# Patient Record
Sex: MALE | Race: WHITE
[De-identification: names, ages, dates, MRNs, and addresses within clinical notes are randomized per-mention and may not be internally consistent; named-entity substitution may affect disease eponyms.]

---

## 2018-01-03 ENCOUNTER — HOSPITAL ENCOUNTER (EMERGENCY)
Dept: HOSPITAL 11 - JP.ED | Age: 43
Discharge: HOME | End: 2018-01-03
Payer: COMMERCIAL

## 2018-01-03 VITALS — SYSTOLIC BLOOD PRESSURE: 186 MMHG | DIASTOLIC BLOOD PRESSURE: 110 MMHG
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
## 2018-01-03 DIAGNOSIS — I10: ICD-10-CM

## 2018-01-03 DIAGNOSIS — Z88.0: ICD-10-CM

## 2018-01-03 DIAGNOSIS — L50.0: Primary | ICD-10-CM

## 2018-01-03 DIAGNOSIS — E03.9: ICD-10-CM

## 2018-01-03 PROCEDURE — 96375 TX/PRO/DX INJ NEW DRUG ADDON: CPT

## 2018-01-03 PROCEDURE — 99283 EMERGENCY DEPT VISIT LOW MDM: CPT

## 2018-01-03 PROCEDURE — 96361 HYDRATE IV INFUSION ADD-ON: CPT

## 2018-01-03 PROCEDURE — 96374 THER/PROPH/DIAG INJ IV PUSH: CPT

## 2018-01-03 NOTE — EDM.PDOC
ED HPI GENERAL MEDICAL PROBLEM





- General


Chief Complaint: Allergic Reaction


Stated Complaint: ALLERGIC REACTION


Time Seen by Provider: 01/03/18 19:29


Source of Information: Reports: Patient


History Limitations: Reports: No Limitations





- History of Present Illness


INITIAL COMMENTS - FREE TEXT/NARRATIVE: 





Hives, Allergic reaction; this is a 42 yr old male presents to ER for hives. He 

reports was ice fishing, wearing a new hat with rabbit fur, his face and scalp 

felt hot and began to itch, then feeling radiated to upper back and chest. 

started to have a coughing spell, took a benadryl, showered, but rash was worsen

, decided to come to ER for evaluation. 





skin; rash


chest; cough prior to coming to ER, 


Onset: Sudden


Onset Date: 01/03/18


Onset Time: 15:00


Duration: Hour(s):, Constant


Location: Reports: Generalized


Quality: Reports: Other (rash, hives)


Severity: Mild


Improves with: Reports: None


Worsens with: Reports: None


Associated Symptoms: Reports: Cough, Rash


Treatments PTA: Reports: Other Medication(s) (benadryl at 6 pm.), Other (see 

below)


Other Treatments PTA: Benadryl





- Related Data


 Allergies











Allergy/AdvReac Type Severity Reaction Status Date / Time


 


Penicillins Allergy  Hives Verified 11/10/15 14:24











Home Meds: 


 Home Meds





Levothyroxine 25 mcg PO DAILY 07/26/14 [History]


Lisinopril 20 mg PO DAILY 07/26/14 [History]


LORazepam [LORazepam] 0.5 mg PO ASDIRECTED PRN 01/03/18 [History]


Metoprolol Tartrate [Metoprolol Tartrate] 25 mg PO DAILY 01/03/18 [History]


PARoxetine [Paxil] 20 mg PO DAILY 01/03/18 [History]











Past Medical History


Cardiovascular History: Reports: Hypertension


Psychiatric History: Reports: Anxiety


Endocrine/Metabolic History: Reports: Hypothyroidism





- Past Surgical History


Other HEENT Surgeries/Procedures: Lasik to both eyes





Social & Family History





- Tobacco Use


Smoking Status *Q: Former Smoker


Years of Tobacco use: 29


Packs/Tins Daily: 1


Used Tobacco, but Quit: Yes


Month Tobacco Last Used: 1/6/12





- Caffeine Use


Caffeine Use: Reports: Soda





- Alcohol Use


Days Per Week of Alcohol Use: 2


Number of Drinks Per Day: 2


Total Drinks Per Week: 4


Date of Last Drink: 12/31/17


Time of Last Drink: 22:00





- Recreational Drug Use


Recreational Drug Use: No





- Living Situation & Occupation


Living situation: Reports: 


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





ED ROS ALLERGIC REACTION





- Review of Systems


Review Of Systems: See Below


Constitutional: Reports: Other (anxious)


HEENT: Reports: No Symptoms


Respiratory: Reports: Cough


Cardiovascular: Reports: No Symptoms


Endocrine: Reports: No Symptoms


GI/Abdominal: Reports: No Symptoms


: Reports: No Symptoms


Musculoskeletal: Reports: No Symptoms


Skin: Reports: Rash, Urticaria (face, upper chest and back, arms.)


Neurological: Reports: No Symptoms


Psychiatric: Reports: Anxiety


Hematologic/Lymphatic: Reports: No Symptoms


Immunologic: Reports: Other (concerns for new allergy to rabbit fur)





ED EXAM GENERAL NO PERIP PULSE





- Physical Exam


Exam: See Below


Exam Limited By: No Limitations


General Appearance: Alert, WD/WN, Mild Distress


Eye Exam: Bilateral Eye: EOMI, Normal Inspection


Ears: Normal External Exam, Normal Canal, Hearing Grossly Normal, Normal TMs


Nose: Normal Inspection, Normal Mucosa, No Blood


Throat/Mouth: Normal Inspection, Normal Lips, Normal Teeth, Normal Gums, Normal 

Oropharynx, Normal Voice, No Airway Compromise


Head: Atraumatic, Normocephalic


Neck: Normal Inspection, Supple, Non-Tender, Full Range of Motion


Respiratory/Chest: No Respiratory Distress, Lungs Clear, Normal Breath Sounds, 

No Accessory Muscle Use, Chest Non-Tender


Cardiovascular: Normal Peripheral Pulses, Regular Rate, Rhythm, No Edema, No 

Murmur, No Rub


GI/Abdominal: Normal Bowel Sounds, Soft, Non-Tender, No Organomegaly, No 

Distention, No Abnormal Bruit, No Mass


 (Male) Exam: Deferred


Rectal (Males) Exam: Deferred


Back Exam: Normal Inspection, Full Range of Motion, NT


Extremities: Normal Inspection, Normal Range of Motion, Non-Tender, Normal 

Capillary Refill, No Pedal Edema


Neurological: Alert, Oriented, CN II-XII Intact, Normal Cognition, Normal Gait, 

Normal Reflexes, No Motor/Sensory Deficits


Psychiatric: Anxious


Skin Exam: Warm, Dry, Other (hives noted to face, neck, upper chest and back, 

arm. mild raised macupapular rash. few scatterer wheals. + pruritus)


Lymphatic: No Adenopathy





Course





- Vital Signs


Last Recorded V/S: 


 Last Vital Signs











Temp  36.3 C   01/03/18 20:30


 


Pulse  93   01/03/18 20:30


 


Resp  17   01/03/18 20:30


 


BP  180/120 H  01/03/18 20:30


 


Pulse Ox  99   01/03/18 20:30














- Orders/Labs/Meds


Orders: 


 Active Orders 24 hr











 Category Date Time Status


 


 Sodium Chloride 0.9% [Normal Saline] 1,000 ml Med  01/03/18 19:45 Active





 IV ASDIRECTED   








 Medication Orders





Sodium Chloride (Normal Saline)  1,000 mls @ 999 mls/hr IV ASDIRECTED NICOLA


   Last Admin: 01/03/18 20:09  Dose: 999 mls/hr








Meds: 


Medications











Generic Name Dose Route Start Last Admin





  Trade Name Freq  PRN Reason Stop Dose Admin


 


Sodium Chloride  1,000 mls @ 999 mls/hr  01/03/18 19:45  01/03/18 20:09





  Normal Saline  IV   999 mls/hr





  ASDIRECTED NICOLA   Administration














Discontinued Medications














Generic Name Dose Route Start Last Admin





  Trade Name Freq  PRN Reason Stop Dose Admin


 


Lisinopril  20 mg  01/03/18 19:42  01/03/18 20:20





  Prinivil  PO  01/03/18 19:43  20 mg





  ONETIME ONE   Administration


 


Lisinopril  Confirm  01/03/18 20:18  





  Prinivil  Administered  01/03/18 20:19  





  Dose   





  10 mg   





  .ROUTE   





  .STK-MED ONE   


 


Lorazepam  1 mg  01/03/18 19:41  01/03/18 20:15





  Ativan  IVPUSH  01/03/18 19:42  1 mg





  ONETIME ONE   Administration


 


Methylprednisolone Sodium Succinate  125 mg  01/03/18 19:43  01/03/18 20:11





  Solu-Medrol  IVPUSH  01/03/18 19:44  125 mg





  ONETIME ONE   Administration


 


Metoprolol Tartrate  25 mg  01/03/18 19:42  01/03/18 20:13





  Lopressor  PO  01/03/18 19:43  25 mg





  ONETIME ONE   Administration














- Re-Assessments/Exams


Free Text/Narrative Re-Assessment/Exam: 





01/03/18 21:06


given IV Normal Saline  1liter, IV ativan 1mg, IV solumedrol 125mg ; improved, 

ready for discharge to home.





given home medication Metoprolol 25 mg po  and Lisinopril 20 mg po








Departure





- Departure


Time of Disposition: 21:09


Disposition: Home, Self-Care 01


Condition: Good


Clinical Impression: 


Hypertension


Qualifiers:


 Hypertension type: unspecified Qualified Code(s): I10 - Essential (primary) 

hypertension





Allergic reaction


Qualifiers:


 Encounter type: initial encounter Qualified Code(s): T78.40XA - Allergy, 

unspecified, initial encounter








- Discharge Information


Referrals: 


PCP,None [Primary Care Provider] - 


Forms:  ED Department Discharge


Care Plan Goals: 


Allergic Reaction


-given Solumedrol 125mg iv in ER, IV fluids; improved





-home medication


  start in morning Medrol dose pack as directed


  continue Benadryl 25mg by mouth every 4 to 6 hours





avoid any exercise, running, physical activities for the next 5 days.


return to ER for shortness of breath, cough, worsen hives/rash, swelling or any 

concerns.





High blood pressure


-take medications daily as prescribed by your Primary Care Provider


-given Metoprolol tartate 25mg po and Lisinopril 20 mg po in ER


-restart medication in am.





Return to Clinic or ER if not improved or symptoms worsen.





- Problem List & Annotations


(1) Hypertension


SNOMED Code(s): 22591269


   Code(s): I10 - ESSENTIAL (PRIMARY) HYPERTENSION   Status: Acute   Priority: 

High   Current Visit: Yes   


Qualifiers: 


   Hypertension type: unspecified   Qualified Code(s): I10 - Essential (primary

) hypertension   





(2) Allergic reaction


SNOMED Code(s): 237538325


   Code(s): T78.40XA - ALLERGY, UNSPECIFIED, INITIAL ENCOUNTER   Status: Acute 

  Priority: High   Current Visit: Yes   


Qualifiers: 


   Encounter type: initial encounter   Qualified Code(s): T78.40XA - Allergy, 

unspecified, initial encounter   





- Problem List Review


Problem List Initiated/Reviewed/Updated: Yes





- My Orders


Last 24 Hours: 


My Active Orders





01/03/18 19:45


Sodium Chloride 0.9% [Normal Saline] 1,000 ml IV ASDIRECTED 














- Assessment/Plan


Last 24 Hours: 


My Active Orders





01/03/18 19:45


Sodium Chloride 0.9% [Normal Saline] 1,000 ml IV ASDIRECTED 











Plan: 


Allergic Reaction


-given IV Solumedrol 125mg, IV Ativan 1 mg., Normal Saline IV fluids; improved





-home medication


  start in morning Medrol dose pack as directed


  continue Benadryl 25mg by mouth every 4 to 6 hours





avoid any exercise, running, physical activities for the next 5 days.


return to ER for shortness of breath, cough, worsen hives/rash, swelling or any 

concerns.





High blood pressure


-take medications daily as prescribed by your Primary Care Provider


-given Metoprolol tartate 25mg po and Lisinopril 20 mg po in ER


-restart medication in am.





Return to Clinic or ER if not improved or symptoms worsen.

## 2019-03-23 ENCOUNTER — HOSPITAL ENCOUNTER (EMERGENCY)
Dept: HOSPITAL 11 - JP.ED | Age: 44
Discharge: HOME | End: 2019-03-23
Payer: COMMERCIAL

## 2019-03-23 VITALS — DIASTOLIC BLOOD PRESSURE: 109 MMHG | SYSTOLIC BLOOD PRESSURE: 157 MMHG

## 2019-03-23 DIAGNOSIS — E03.9: ICD-10-CM

## 2019-03-23 DIAGNOSIS — F41.9: ICD-10-CM

## 2019-03-23 DIAGNOSIS — Z88.0: ICD-10-CM

## 2019-03-23 DIAGNOSIS — Z79.899: ICD-10-CM

## 2019-03-23 DIAGNOSIS — I10: ICD-10-CM

## 2019-03-23 DIAGNOSIS — Z87.891: ICD-10-CM

## 2019-03-23 DIAGNOSIS — T63.441A: Primary | ICD-10-CM

## 2019-03-23 PROCEDURE — 96374 THER/PROPH/DIAG INJ IV PUSH: CPT

## 2019-03-23 PROCEDURE — 99282 EMERGENCY DEPT VISIT SF MDM: CPT

## 2019-03-23 NOTE — EDM.PDOC
ED HPI GENERAL MEDICAL PROBLEM





- General


Chief Complaint: Eye Problems


Stated Complaint: STUNG BY BEE


Time Seen by Provider: 03/23/19 16:12


Source of Information: Reports: Patient


History Limitations: Reports: No Limitations





- History of Present Illness


INITIAL COMMENTS - FREE TEXT/NARRATIVE: 


45 yo otherwise healthy presents with concerns of bee sting


Was working with bees this afternoon


Stung near the left eye


Has noticed swelling here, some itching involving the left lip.


No perioral, tongue, or oropharyngeal tingling


No dyspnea


No rash


Hx of prior bee stings, caused swelling, never this severe








- Related Data


 Allergies











Allergy/AdvReac Type Severity Reaction Status Date / Time


 


Penicillins Allergy  Hives Verified 03/23/19 15:20











Home Meds: 


 Home Meds





RX: Levothyroxine 25 mcg PO DAILY 07/26/14 [History]


RX: Lisinopril 20 mg PO DAILY 07/26/14 [History]


LORazepam 0.5 mg PO ASDIRECTED PRN 01/03/18 [History]


Metoprolol Tartrate 50 mg PO DAILY 01/03/18 [History]


PARoxetine [Paxil] 20 mg PO DAILY 01/03/18 [History]











Past Medical History


Cardiovascular History: Reports: Hypertension


Neurological History: Reports: Concussion


Psychiatric History: Reports: Anxiety


Endocrine/Metabolic History: Reports: Hypothyroidism





- Past Surgical History


Other HEENT Surgeries/Procedures: Lasik to both eyes





Social & Family History





- Tobacco Use


Smoking Status *Q: Former Smoker


Used Tobacco, but Quit: Yes


Month/Year Tobacco Last Used: 0





- Caffeine Use


Caffeine Use: Reports: Soda





- Recreational Drug Use


Recreational Drug Use: No





- Living Situation & Occupation


Living situation: Reports: 


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





ED ROS GENERAL





- Review of Systems


Review Of Systems: See Below


Constitutional: Reports: No Symptoms


HEENT: Reports: No Symptoms


Respiratory: Reports: No Symptoms.  Denies: Shortness of Breath, Wheezing


Cardiovascular: Reports: No Symptoms


Endocrine: Reports: No Symptoms


GI/Abdominal: Denies: Abdominal Pain


: Reports: No Symptoms


Musculoskeletal: Reports: No Symptoms


Skin: Reports: Other (swelling around left eye).  Denies: Urticaria


Neurological: Reports: No Symptoms


Psychiatric: Reports: No Symptoms


Hematologic/Lymphatic: Reports: No Symptoms


Immunologic: Reports: No Symptoms





ED EXAM GENERAL W FULL EYE





- Physical Exam


Exam: See Below


Exam Limited By: No Limitations


General Appearance: Alert, No Apparent Distress


Comments: 


erythema and swelling around the left eye





Ears: Normal External Exam


Nose: Normal Inspection


Throat/Mouth: Normal Inspection, No Airway Compromise


Head: Atraumatic, Normocephalic


Neck: Normal Inspection


Respiratory/Chest: Lungs Clear.  No: Wheezing


Cardiovascular: Regular Rate, Rhythm


GI/Abdominal: Soft, Non-Tender


Back Exam: Normal Inspection


Extremities: Normal Inspection


Neurological: Alert, Oriented


Psychiatric: Normal Affect


Skin Exam: Warm, Dry.  No: Rash





Course





- Vital Signs


Last Recorded V/S: 


 Last Vital Signs











Temp  35.0 C L  03/23/19 15:19


 


Pulse  83   03/23/19 16:59


 


Resp  25 H  03/23/19 16:59


 


BP  157/109 H  03/23/19 16:59


 


Pulse Ox  95   03/23/19 16:59














- Orders/Labs/Meds


Meds: 


Medications














Discontinued Medications














Generic Name Dose Route Start Last Admin





  Trade Name Freq  PRN Reason Stop Dose Admin


 


Methylprednisolone Sodium Succinate  125 mg  03/23/19 15:54  03/23/19 16:01





  Solu-Medrol  IVPUSH  03/23/19 15:55  125 mg





  ONETIME ONE   Administration





     





     





     





     














- Re-Assessments/Exams


Free Text/Narrative Re-Assessment/Exam: 


45 yo with facial swelling following bee sting. 


Localized reaction at this point. No airway involvement or other signs of 

anaphylaxis


Took 50 mg benadryl PTA


Administering solu-medrol


Will observe  


03/23/19 16:15








Departure





- Departure


Time of Disposition: 17:03


Disposition: Home, Self-Care 01


Clinical Impression: 


Bee sting


Qualifiers:


 Encounter type: initial encounter Injury intent: accidental or unintentional 

Qualified Code(s): T63.441A - Toxic effect of venom of bees, accidental (

unintentional), initial encounter








- Discharge Information


Referrals: 


PCP,None [Primary Care Provider] - 


Forms:  ED Department Discharge


Additional Instructions: 


Please take the prescribed prednisone and benadryl


Return to the ER for shortness of breath, abdominal pain, or worsening symptoms

## 2020-08-17 NOTE — EDM.PDOC
ED HPI GENERAL MEDICAL PROBLEM





- General


Chief Complaint: Skin Complaint


Stated Complaint: CELLULITIS ON NOSE


Time Seen by Provider: 08/17/20 05:28


Source of Information: Reports: Patient


History Limitations: Reports: No Limitations





- History of Present Illness


INITIAL COMMENTS - FREE TEXT/NARRATIVE: 





Patient presents for continued evaluation redness and pain on his nose and face.

 Symptoms began on Friday, August 14 with a single red spot on the the right 

side of the nose.  It was burning and extremely painful.  He was seen by primary

care who prescribed doxycycline for presumed skin/soft tissue infection.  He 

continued to have increasing areas of redness and pain over the right cheek and 

did a video virtual visit Sunday and was prescribed cephalexin in addition to 

doxycycline for symptoms.  He noticed pain in the roof of his mouth and thought 

that he might have burned his mouth eating pizza but he is also noticed swelling

of the upper lip and some single red patches that are also burning and very 

painful on the right half of the upper lip region and now extending laterally 

toward the ear.  He has been unable to sleep for the last 2 nights because of 

the pain associated with this infection.


Onset: Gradual


Duration: Day(s): (4)


Location: Reports: Face


Quality: Reports: Burning, Throbbing


Severity: Moderate


Improves with: Reports: None


Worsens with: Reports: Movement


Associated Symptoms: Reports: No Other Symptoms


  ** Middle Nose


Pain Score (Numeric/FACES): 8





- Related Data


                                    Allergies











Allergy/AdvReac Type Severity Reaction Status Date / Time


 


Penicillins Allergy  Hives Verified 08/17/20 05:17











Home Meds: 


                                    Home Meds





Levothyroxine 25 mcg PO DAILY 07/26/14 [History]


Lisinopril 20 mg PO DAILY 07/26/14 [History]


LORazepam 0.5 mg PO ASDIRECTED PRN 01/03/18 [History]


Metoprolol Tartrate 50 mg PO DAILY 01/03/18 [History]


PARoxetine [Paxil] 20 mg PO DAILY 01/03/18 [History]


Multivitamin 1 tab PO DAILY 08/17/20 [History]











Past Medical History


HEENT History: Reports: Impaired Vision


Cardiovascular History: Reports: Hypertension


Respiratory History: Reports: Asthma


Neurological History: Reports: Concussion


Psychiatric History: Reports: Anxiety


Endocrine/Metabolic History: Reports: Hypothyroidism





- Past Surgical History


HEENT Surgical History: Reports: LASIK


Other HEENT Surgeries/Procedures: Lasik to both eyes





Social & Family History





- Tobacco Use


Smoking Status *Q: Never Smoker





- Caffeine Use


Caffeine Use: Reports: Soda





- Alcohol Use


Days Per Week of Alcohol Use: 1


Number of Drinks Per Day: 4


Total Drinks Per Week: 4





- Recreational Drug Use


Recreational Drug Use: No





- Living Situation & Occupation


Living situation: Reports: 


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





ED ROS GENERAL





- Review of Systems


Review Of Systems: See Below


HEENT: Reports: Dental Pain (Right maxillary anterior teeth region.), Other 

(Swelling of upper lip, right side along with red spots.  Pain in the roof of 

the mouth.).  Denies: Throat Pain, Throat Swelling


Respiratory: Reports: No Symptoms


Cardiovascular: Reports: No Symptoms


Skin: Reports: Erythema, Lesions





ED EXAM, SKIN/RASH


Exam: See Below


Text/Narrative:: 





This is an adult male seated on a side chair in room 1.  His nose and right 

cheek region have scattered lesions and redness.  There is some dried crusted 

material on a couple of spots.


Exam Limited By: No Limitations


General Appearance: Moderate Distress


Ears: Normal External Exam


Nose: Other (The right half of the nose is confluently red, swollen, extremely 

painful.)


Throat/Mouth: Other (The right half of the hard palate shows a variety of 

irregular lesions there is also 1 on the buccal mucosa of the right cheek.)


Head: Atraumatic, Facial Swelling, Facial Tenderness (Palpation along the middle

 division of cranial nerve V is extremely painful.  There is 1 small red lesion 

on lower right eyelid.)


Neck: Normal Inspection


Respiratory/Chest: No Respiratory Distress


Cardiovascular: Regular Rate, Rhythm





Course





- Vital Signs


Last Recorded V/S: 


                                Last Vital Signs











Temp  36.8 C   08/17/20 05:23


 


Pulse  76   08/17/20 05:23


 


Resp  16   08/17/20 05:23


 


BP  141/90 H  08/17/20 05:23


 


Pulse Ox  96   08/17/20 05:23














- Re-Assessments/Exams


Free Text/Narrative Re-Assessment/Exam: 





08/17/20 06:30


I am concerned he has herpes zoster as a cause for his symptoms.  The fact that 

it is only the right half of the nose, the right half of the hard palate and the

 lesions on the cheek are following along 1 of the divisions of the trigeminal 

nerve seem most consistent with herpes zoster.  He did have the zosters 

vaccination in 2018 but the history and exam seems most consistent at the day.  

[facility] prescriptions were placed for acyclovir 400 mg, 20 tablets: Tramadol 50

 mg, 15 tablets; both use as directed.  For the time being, he should continue 

his doxycycline and cephalexin.  He needs to contact his primary care team today

 to arrange a follow-up evaluation.  If this is herpes zoster the potential for 

involving the right eye is significant and he would need evaluation for that.  

Reasons to return to emergency department reviewed.





Departure





- Departure


Time of Disposition: 06:08


Disposition: Home, Self-Care 01


Condition: Good


Clinical Impression: 


Zoster


Qualifiers:


 Herpes zoster complications: without complications Qualified Code(s): B02.9 - 

Zoster without complications








- Discharge Information


Instructions:  Shingles, Easy-to-Read


Referrals: 


Jose Manuel Estes NP [Primary Care Provider] - 


Forms:  ED Department Discharge


Additional Instructions: 


Start acyclovir, taking 2 tablets 3 times a day.  Use tramadol 1 tablet up to 3 

times a day as needed for pain.  Contact your primary care team this morning to 

arrange a recheck visit within the next 24 hours.  This looks like the start of 

shingles, the other name of which is herpes zoster.  Continue your antibiotics 

until you recheck with your primary team in person.





Sepsis Event Note (ED)





- Evaluation


Sepsis Screening Result: No Definite Risk





- Focused Exam


Vital Signs: 


                                   Vital Signs











  Temp Pulse Resp BP Pulse Ox


 


 08/17/20 05:23  36.8 C  76  16  141/90 H  96